# Patient Record
Sex: MALE | Race: WHITE | NOT HISPANIC OR LATINO
[De-identification: names, ages, dates, MRNs, and addresses within clinical notes are randomized per-mention and may not be internally consistent; named-entity substitution may affect disease eponyms.]

---
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
## 2022-09-17 ENCOUNTER — EMERGENCY (EMERGENCY)
Facility: HOSPITAL | Age: 22
LOS: 0 days | Discharge: HOME | End: 2022-09-17
Attending: EMERGENCY MEDICINE | Admitting: EMERGENCY MEDICINE

## 2022-09-17 VITALS
TEMPERATURE: 100 F | WEIGHT: 182.98 LBS | SYSTOLIC BLOOD PRESSURE: 123 MMHG | RESPIRATION RATE: 17 BRPM | HEART RATE: 108 BPM | DIASTOLIC BLOOD PRESSURE: 65 MMHG

## 2022-09-17 VITALS
SYSTOLIC BLOOD PRESSURE: 123 MMHG | HEART RATE: 99 BPM | DIASTOLIC BLOOD PRESSURE: 58 MMHG | TEMPERATURE: 100 F | RESPIRATION RATE: 20 BRPM | OXYGEN SATURATION: 96 %

## 2022-09-17 DIAGNOSIS — R11.2 NAUSEA WITH VOMITING, UNSPECIFIED: ICD-10-CM

## 2022-09-17 DIAGNOSIS — R19.7 DIARRHEA, UNSPECIFIED: ICD-10-CM

## 2022-09-17 DIAGNOSIS — R10.13 EPIGASTRIC PAIN: ICD-10-CM

## 2022-09-17 DIAGNOSIS — R50.9 FEVER, UNSPECIFIED: ICD-10-CM

## 2022-09-17 DIAGNOSIS — Z88.1 ALLERGY STATUS TO OTHER ANTIBIOTIC AGENTS STATUS: ICD-10-CM

## 2022-09-17 DIAGNOSIS — Z88.0 ALLERGY STATUS TO PENICILLIN: ICD-10-CM

## 2022-09-17 DIAGNOSIS — R63.0 ANOREXIA: ICD-10-CM

## 2022-09-17 DIAGNOSIS — Z20.822 CONTACT WITH AND (SUSPECTED) EXPOSURE TO COVID-19: ICD-10-CM

## 2022-09-17 LAB
ALBUMIN SERPL ELPH-MCNC: 5.1 G/DL — SIGNIFICANT CHANGE UP (ref 3.5–5.2)
ALP SERPL-CCNC: 69 U/L — SIGNIFICANT CHANGE UP (ref 30–115)
ALT FLD-CCNC: 37 U/L — SIGNIFICANT CHANGE UP (ref 13–38)
ANION GAP SERPL CALC-SCNC: 14 MMOL/L — SIGNIFICANT CHANGE UP (ref 7–14)
APPEARANCE UR: CLEAR — SIGNIFICANT CHANGE UP
AST SERPL-CCNC: 21 U/L — SIGNIFICANT CHANGE UP (ref 13–38)
BASOPHILS # BLD AUTO: 0.03 K/UL — SIGNIFICANT CHANGE UP (ref 0–0.2)
BASOPHILS NFR BLD AUTO: 0.2 % — SIGNIFICANT CHANGE UP (ref 0–1)
BILIRUB SERPL-MCNC: 0.4 MG/DL — SIGNIFICANT CHANGE UP (ref 0.2–1.2)
BILIRUB UR-MCNC: NEGATIVE — SIGNIFICANT CHANGE UP
BUN SERPL-MCNC: 13 MG/DL — SIGNIFICANT CHANGE UP (ref 10–20)
CALCIUM SERPL-MCNC: 9.7 MG/DL — SIGNIFICANT CHANGE UP (ref 8.4–10.5)
CHLORIDE SERPL-SCNC: 100 MMOL/L — SIGNIFICANT CHANGE UP (ref 98–110)
CO2 SERPL-SCNC: 22 MMOL/L — SIGNIFICANT CHANGE UP (ref 17–32)
COLOR SPEC: COLORLESS — SIGNIFICANT CHANGE UP
CREAT SERPL-MCNC: 0.8 MG/DL — SIGNIFICANT CHANGE UP (ref 0.7–1.5)
DIFF PNL FLD: NEGATIVE — SIGNIFICANT CHANGE UP
EGFR: 130 ML/MIN/1.73M2 — SIGNIFICANT CHANGE UP
EOSINOPHIL # BLD AUTO: 0 K/UL — SIGNIFICANT CHANGE UP (ref 0–0.7)
EOSINOPHIL NFR BLD AUTO: 0 % — SIGNIFICANT CHANGE UP (ref 0–8)
GLUCOSE SERPL-MCNC: 112 MG/DL — HIGH (ref 70–99)
GLUCOSE UR QL: NEGATIVE — SIGNIFICANT CHANGE UP
HCT VFR BLD CALC: 45.1 % — SIGNIFICANT CHANGE UP (ref 42–52)
HGB BLD-MCNC: 16.1 G/DL — SIGNIFICANT CHANGE UP (ref 14–18)
IMM GRANULOCYTES NFR BLD AUTO: 0.5 % — HIGH (ref 0.1–0.3)
KETONES UR-MCNC: NEGATIVE — SIGNIFICANT CHANGE UP
LACTATE SERPL-SCNC: 2.5 MMOL/L — HIGH (ref 0.7–2)
LEUKOCYTE ESTERASE UR-ACNC: NEGATIVE — SIGNIFICANT CHANGE UP
LIDOCAIN IGE QN: 18 U/L — SIGNIFICANT CHANGE UP (ref 7–60)
LYMPHOCYTES # BLD AUTO: 0.6 K/UL — LOW (ref 1.2–3.4)
LYMPHOCYTES # BLD AUTO: 3.5 % — LOW (ref 20.5–51.1)
MCHC RBC-ENTMCNC: 30.1 PG — SIGNIFICANT CHANGE UP (ref 27–31)
MCHC RBC-ENTMCNC: 35.7 G/DL — SIGNIFICANT CHANGE UP (ref 32–37)
MCV RBC AUTO: 84.3 FL — SIGNIFICANT CHANGE UP (ref 80–94)
MONOCYTES # BLD AUTO: 1.15 K/UL — HIGH (ref 0.1–0.6)
MONOCYTES NFR BLD AUTO: 6.8 % — SIGNIFICANT CHANGE UP (ref 1.7–9.3)
NEUTROPHILS # BLD AUTO: 15.1 K/UL — HIGH (ref 1.4–6.5)
NEUTROPHILS NFR BLD AUTO: 89 % — HIGH (ref 42.2–75.2)
NITRITE UR-MCNC: NEGATIVE — SIGNIFICANT CHANGE UP
NRBC # BLD: 0 /100 WBCS — SIGNIFICANT CHANGE UP (ref 0–0)
PH UR: 6.5 — SIGNIFICANT CHANGE UP (ref 5–8)
PLATELET # BLD AUTO: 259 K/UL — SIGNIFICANT CHANGE UP (ref 130–400)
POTASSIUM SERPL-MCNC: 4.3 MMOL/L — SIGNIFICANT CHANGE UP (ref 3.5–5)
POTASSIUM SERPL-SCNC: 4.3 MMOL/L — SIGNIFICANT CHANGE UP (ref 3.5–5)
PROT SERPL-MCNC: 7 G/DL — SIGNIFICANT CHANGE UP (ref 6–8)
PROT UR-MCNC: NEGATIVE — SIGNIFICANT CHANGE UP
RAPID RVP RESULT: SIGNIFICANT CHANGE UP
RBC # BLD: 5.35 M/UL — SIGNIFICANT CHANGE UP (ref 4.7–6.1)
RBC # FLD: 11.8 % — SIGNIFICANT CHANGE UP (ref 11.5–14.5)
SARS-COV-2 RNA SPEC QL NAA+PROBE: SIGNIFICANT CHANGE UP
SODIUM SERPL-SCNC: 136 MMOL/L — SIGNIFICANT CHANGE UP (ref 135–146)
SP GR SPEC: 1 — LOW (ref 1.01–1.03)
UROBILINOGEN FLD QL: SIGNIFICANT CHANGE UP
WBC # BLD: 16.97 K/UL — HIGH (ref 4.8–10.8)
WBC # FLD AUTO: 16.97 K/UL — HIGH (ref 4.8–10.8)

## 2022-09-17 PROCEDURE — 99285 EMERGENCY DEPT VISIT HI MDM: CPT

## 2022-09-17 PROCEDURE — G1004: CPT

## 2022-09-17 PROCEDURE — 74177 CT ABD & PELVIS W/CONTRAST: CPT | Mod: 26,ME

## 2022-09-17 RX ORDER — ACETAMINOPHEN 500 MG
650 TABLET ORAL ONCE
Refills: 0 | Status: COMPLETED | OUTPATIENT
Start: 2022-09-17 | End: 2022-09-17

## 2022-09-17 RX ORDER — DIATRIZOATE MEGLUMINE 180 MG/ML
30 INJECTION, SOLUTION INTRAVESICAL ONCE
Refills: 0 | Status: COMPLETED | OUTPATIENT
Start: 2022-09-17 | End: 2022-09-17

## 2022-09-17 RX ORDER — SODIUM CHLORIDE 9 MG/ML
1000 INJECTION INTRAMUSCULAR; INTRAVENOUS; SUBCUTANEOUS ONCE
Refills: 0 | Status: COMPLETED | OUTPATIENT
Start: 2022-09-17 | End: 2022-09-17

## 2022-09-17 RX ORDER — FAMOTIDINE 10 MG/ML
20 INJECTION INTRAVENOUS ONCE
Refills: 0 | Status: COMPLETED | OUTPATIENT
Start: 2022-09-17 | End: 2022-09-17

## 2022-09-17 RX ORDER — ONDANSETRON 8 MG/1
4 TABLET, FILM COATED ORAL ONCE
Refills: 0 | Status: COMPLETED | OUTPATIENT
Start: 2022-09-17 | End: 2022-09-17

## 2022-09-17 RX ADMIN — DIATRIZOATE MEGLUMINE 30 MILLILITER(S): 180 INJECTION, SOLUTION INTRAVESICAL at 10:21

## 2022-09-17 RX ADMIN — Medication 650 MILLIGRAM(S): at 10:01

## 2022-09-17 RX ADMIN — SODIUM CHLORIDE 1000 MILLILITER(S): 9 INJECTION INTRAMUSCULAR; INTRAVENOUS; SUBCUTANEOUS at 10:01

## 2022-09-17 RX ADMIN — FAMOTIDINE 20 MILLIGRAM(S): 10 INJECTION INTRAVENOUS at 10:02

## 2022-09-17 RX ADMIN — ONDANSETRON 4 MILLIGRAM(S): 8 TABLET, FILM COATED ORAL at 10:05

## 2022-09-17 NOTE — ED PROVIDER NOTE - CLINICAL SUMMARY MEDICAL DECISION MAKING FREE TEXT BOX
pt feels better, maury po, ed work up neg for acute surg pathology, supportive care advised, dc home, f/u pmd 1-2 weeks, strict return precautions

## 2022-09-17 NOTE — ED PROVIDER NOTE - PATIENT PORTAL LINK FT
You can access the FollowMyHealth Patient Portal offered by [facility] by registering at the following website: [email or web address]. By joining [email or web address]’s FollowMyHealth portal, you will also be able to view your health information using other applications (apps) compatible with our system.

## 2022-09-17 NOTE — ED PROVIDER NOTE - OBJECTIVE STATEMENT
23 yo M presents to the ED with abdominal pain, diarrhea x1 and 2x episodes of NBNB emesis that started at midnight. The pain is epigastric, non radiating, 7/10 when it started, now the pain is less, worse with movement and he took no medication. He ate pasta and ground meat yesterday in the school campus. He is voiding since the onset of symptoms but has not eaten since last night. Mother did a COVID Ag at home and he was negative. He is not sexually active, and denies taking drugs or smoking.  All: PCN, erythromycin (rash)  Vaccines: UTD

## 2022-09-17 NOTE — ED PROVIDER NOTE - ATTENDING CONTRIBUTION TO CARE
22-year-old male no past medical history/no past surgical history, presents with 1 day of diffuse sharp constant abdominal pain nonradiating, 2 episodes of nonbilious nonbloody emesis, and 1 episode of watery diarrhea.  Low-grade fever associated.  No cough URI symptoms.  No dysuria frequency hematuria.  Took a COVID test at home which was negative.  Immunizations up-to-date.  PMD Dr. Rodriguez.  Non-smoker.  Allergic to penicillin and azithromycin.    On exam, temp 100.3 in the ED, AFVSS, Well appearing, No acute distress, NCAT, EOMI, PERRLA, MMM, Neck supple, LCTAB, RRR nl s1s2 No mrg, Abdomen Soft mild diffuse tenderness to palpation, worse in the right lower quadrant, no rebound or rigidity, no CVA tenderness, ND, AAOx3, No Focal Deficits, No LE edema or calf TTP,    A/P; abdominal pain fever nausea vomiting diarrhea, right lower quadrant tenderness–we will do labs urine IV fluids imaging Zofran pain control reeval swab

## 2022-09-17 NOTE — ED PROVIDER NOTE - NS ED ROS FT
Review of Systems  Constitutional: (-) fever (-) weakness (-) diaphoresis (-) pain  Eyes: (-) change in vision (-) photophobia (-) eye pain  ENT: (-) sore throat (-) ear pain  (-) nasal discharge (-) congestion  Cardiovascular: (-) chest pain (-) palpitations  Respiratory: (-) SOB (-) cough (-) WOB (-) wheeze (-) tightness  GI: (+) abdominal pain (+) nausea (+) vomiting (+) diarrhea (-) constipation  : (-) dysuria (-) hematuria (-) increased frequency (-) increased urgency  Integumentary: (-) rash (-) redness (-) joint pain (-) MSK pain (-) swelling  Neurological:  (-) focal deficit (-) altered mental status (-) dizziness (-) headache  General: (-) recent travel (-) sick contacts (+) decreased PO (-) urine output

## 2022-09-17 NOTE — ED PROVIDER NOTE - PHYSICAL EXAMINATION
Physical Exam:  GENERAL: well-appearing, well nourished, no acute distress, AOx3  HEENT: NCAT, conjunctiva clear and not injected, sclera non-icteric, PERRLA, EACs clear, TMs nonbulging/nonerythematous, nares patent, mucous membranes moist, no mucosal lesions, pharynx nonerythematous, no tonsillar hypertrophy or exudate, neck supple, no cervical lymphadenopathy  HEART: RRR, S1, S2, no rubs, murmurs, or gallops, RP/DP present, cap refill <2 seconds  LUNG: CTAB, no wheezing, no ronchi, no crackles, no retractions, no belly breathing, no tachypnea  ABDOMEN: +BS, soft, nontender, nondistended, no hepatomegaly, no splenomegaly, no hernia  NEURO/MSK: grossly intact  NEURO: CNII-XII grossly intact, EOMI, no dysmetria, DTRs normal b/l, no ataxia, sensation intact to light touch, negative Babinski  MUSCULOSKELETAL: passive and active ROM intact, 5/5 strength upper and lower extremities  SKIN: good turgor, no rash, no bruising or prominent lesions  BACK: no CVA tenderness

## 2022-09-17 NOTE — ED PROVIDER NOTE - NSFOLLOWUPINSTRUCTIONS_ED_ALL_ED_FT
Contact a health care provider if:  •Your abdominal pain changes or gets worse.  •You are not hungry or you lose weight without trying.  •You are constipated or have diarrhea for more than 2–3 days.  •You have pain when you urinate or have a bowel movement.  •Your abdominal pain wakes you up at night.  •Your pain gets worse with meals, after eating, or with certain foods.  •You are vomiting and cannot keep anything down.  •You have a fever.  •You have blood in your urine.    Get help right away if:  •Your pain does not go away as soon as your health care provider told you to expect.  •You cannot stop vomiting.    •Your pain is only in areas of the abdomen, such as the right side or the left lower portion of the abdomen. Pain on the right side could be caused by appendicitis.  •You have bloody or black stools, or stools that look like tar.  •You have severe pain, cramping, or bloating in your abdomen.    •You have signs of dehydration, such as:  •Dark urine, very little urine, or no urine.  •Cracked lips.  •Dry mouth.  •Sunken eyes.  •Sleepiness.  •Weakness.  •You have trouble breathing or chest pain. Follow up with your doctor in 1- 3 days.    Contact a health care provider if:  •Your abdominal pain changes or gets worse.  •You are not hungry or you lose weight without trying.  •You are constipated or have diarrhea for more than 2–3 days.  •You have pain when you urinate or have a bowel movement.  •Your abdominal pain wakes you up at night.  •Your pain gets worse with meals, after eating, or with certain foods.  •You are vomiting and cannot keep anything down.  •You have a fever.  •You have blood in your urine.    Get help right away if:  •Your pain does not go away as soon as your health care provider told you to expect.  •You cannot stop vomiting.    •Your pain is only in areas of the abdomen, such as the right side or the left lower portion of the abdomen. Pain on the right side could be caused by appendicitis.  •You have bloody or black stools, or stools that look like tar.  •You have severe pain, cramping, or bloating in your abdomen.    •You have signs of dehydration, such as:  •Dark urine, very little urine, or no urine.  •Cracked lips.  •Dry mouth.  •Sunken eyes.  •Sleepiness.  •Weakness.  •You have trouble breathing or chest pain.

## 2022-09-23 ENCOUNTER — TRANSCRIPTION ENCOUNTER (OUTPATIENT)
Age: 22
End: 2022-09-23

## 2023-01-17 NOTE — ED PROCEDURE NOTE - CPROC ED ANATOMIC LOCATION1
"@ 1/16 2100: pt was upset and aggressive in tone with nurse; stated "yall are all the same" (he had called and the aid said she would notify nurse and failed to do so) I apologized for the miscommunication and asked how I could help. He continued to be upset. Finally I redirected him by informing him I can't do anything about not knowing he needed me but now I know so if he has a need, now is the time to communicate that need. He wanted to show me a video (5 minutes long) he took of himself aiming up his nose while OP to view the "larva" in his nose. Upon watching the video, there was nothing I could see, but I just responded, "oh okay" because the patient made it clear he did not feel validated by staff on his visits when here and that no one takes him seriously. He wanted  to know why ENT refused to see him. I told him it's not so much that they refused but they needed a reason and although he believes there are larva in his nose the CT of the head shows his sinus passages are clear so at this time there is nothing for him to intervene on it. Pt spent the next 40 minutes telling me about each experience per admit and how he was not pleased w/ prior care. I again attempted to redirect by informing him we can't change the past and only move forward today to get his needs met. He mentioned he wanted pain mgmt but "we" never set him up like "we" say we will. I informed him there is a pain mgmt MD in [place] name Carson Broussard, upon DC he can ask CM to possibly set up an appt and transportation. He seemed more agreeable after that suggestion.  " finger

## 2024-05-15 NOTE — ED PROCEDURE NOTE - PROCEDURE NAME, MLM
Laceration Repair You can access the FollowMyHealth Patient Portal offered by [facility] by registering at the following website: [email or web address]. By joining [employer]’s FollowMyHealth portal, you will also be able to view your health information using other applications (apps) compatible with our system.